# Patient Record
Sex: FEMALE | Race: WHITE | NOT HISPANIC OR LATINO | URBAN - METROPOLITAN AREA
[De-identification: names, ages, dates, MRNs, and addresses within clinical notes are randomized per-mention and may not be internally consistent; named-entity substitution may affect disease eponyms.]

---

## 2023-03-01 ENCOUNTER — OFFICE VISIT (OUTPATIENT)
Dept: URBAN - METROPOLITAN AREA CLINIC 74 | Facility: CLINIC | Age: 68
End: 2023-03-01
Payer: MEDICARE

## 2023-03-01 ENCOUNTER — DASHBOARD ENCOUNTERS (OUTPATIENT)
Age: 68
End: 2023-03-01

## 2023-03-01 ENCOUNTER — WEB ENCOUNTER (OUTPATIENT)
Dept: URBAN - METROPOLITAN AREA CLINIC 74 | Facility: CLINIC | Age: 68
End: 2023-03-01

## 2023-03-01 VITALS
HEIGHT: 62 IN | WEIGHT: 156.6 LBS | OXYGEN SATURATION: 97 % | BODY MASS INDEX: 28.82 KG/M2 | DIASTOLIC BLOOD PRESSURE: 72 MMHG | SYSTOLIC BLOOD PRESSURE: 124 MMHG | HEART RATE: 73 BPM | TEMPERATURE: 97.5 F

## 2023-03-01 DIAGNOSIS — Z86.010 HX OF COLONIC POLYPS: ICD-10-CM

## 2023-03-01 DIAGNOSIS — K21.9 GERD WITHOUT ESOPHAGITIS: ICD-10-CM

## 2023-03-01 DIAGNOSIS — Z87.19 HX SBO: ICD-10-CM

## 2023-03-01 DIAGNOSIS — T83.711S: ICD-10-CM

## 2023-03-01 DIAGNOSIS — K66.0 ABDOMINAL ADHESIONS: ICD-10-CM

## 2023-03-01 PROCEDURE — 99204 OFFICE O/P NEW MOD 45 MIN: CPT | Performed by: INTERNAL MEDICINE

## 2023-03-01 RX ORDER — OXYCODONE HYDROCHLORIDE AND ACETAMINOPHEN 10; 325 MG/1; MG/1
1 TABLET AS NEEDED TABLET ORAL
Status: ACTIVE | COMMUNITY

## 2023-03-01 RX ORDER — ONDANSETRON 4 MG/1
1 TABLET ON THE TONGUE AND ALLOW TO DISSOLVE TABLET, ORALLY DISINTEGRATING ORAL ONCE A DAY
Status: ON HOLD | COMMUNITY

## 2023-03-01 NOTE — HPI-TODAY'S VISIT:
Patient presents today to discuss whether GI can help her.  Referred at the recommendation of Munir Linares, a family friend of hers and patient of mine.  She has been seen in the past year with colonoscopy 2016, small adenomatous polyp removed, EGD 2016 with hiatal hernia and esophageal stricture dilated.  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  Patient has a very long story that we detailed together.  After being told there was nothing that urology could do for her she wanted to see if I had any options.  History of bladder repair, mesh, extensive adhesions, involving the colon and pelvic musculature.  She saw urology and 6 weeks ago they attempted exploration in the operating room.  According to the patient, this is purely per patient's review, she was told upon awakening by the physician that you are a mess.  He said Martha Car I did not kill you.  She is crying while giving the story but apparently was told that she had extensive adhesions and that the mesh had grown into the bladder and they were unable to find the bladder.  The aborted procedure.  After discussion with the surgeon he recommended no further intervention and to proceed with conservative management.  There is a history of small bowel obstruction from adhesion at least 3 times, the last was in 2020.  All events have been treated successfully with NG tube.  Patient is very afraid that she is going to have a recurrent small bowel obstruction from these adhesions.  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  That she would need a colostomy bag if she had trouble with bowel movements in the future.  She does not want a colostomy bag.

## 2023-03-03 ENCOUNTER — TELEPHONE ENCOUNTER (OUTPATIENT)
Dept: URBAN - METROPOLITAN AREA CLINIC 74 | Facility: CLINIC | Age: 68
End: 2023-03-03

## 2023-03-03 PROBLEM — 428283002: Status: ACTIVE | Noted: 2023-03-03

## 2023-03-03 PROBLEM — 266435005: Status: ACTIVE | Noted: 2023-03-03
